# Patient Record
Sex: MALE | Race: WHITE
[De-identification: names, ages, dates, MRNs, and addresses within clinical notes are randomized per-mention and may not be internally consistent; named-entity substitution may affect disease eponyms.]

---

## 2018-06-14 ENCOUNTER — HOSPITAL ENCOUNTER (EMERGENCY)
Dept: HOSPITAL 11 - JP.ED | Age: 83
Discharge: HOME | End: 2018-06-14
Payer: MEDICARE

## 2018-06-14 VITALS — SYSTOLIC BLOOD PRESSURE: 126 MMHG | DIASTOLIC BLOOD PRESSURE: 59 MMHG

## 2018-06-14 DIAGNOSIS — Z88.1: ICD-10-CM

## 2018-06-14 DIAGNOSIS — M19.90: ICD-10-CM

## 2018-06-14 DIAGNOSIS — Z79.82: ICD-10-CM

## 2018-06-14 DIAGNOSIS — E86.0: Primary | ICD-10-CM

## 2018-06-14 DIAGNOSIS — Z88.0: ICD-10-CM

## 2018-06-14 DIAGNOSIS — Z87.891: ICD-10-CM

## 2018-06-14 DIAGNOSIS — Z79.899: ICD-10-CM

## 2018-06-14 DIAGNOSIS — E78.00: ICD-10-CM

## 2018-06-14 PROCEDURE — 86140 C-REACTIVE PROTEIN: CPT

## 2018-06-14 PROCEDURE — 84484 ASSAY OF TROPONIN QUANT: CPT

## 2018-06-14 PROCEDURE — 96374 THER/PROPH/DIAG INJ IV PUSH: CPT

## 2018-06-14 PROCEDURE — 70450 CT HEAD/BRAIN W/O DYE: CPT

## 2018-06-14 PROCEDURE — 96361 HYDRATE IV INFUSION ADD-ON: CPT

## 2018-06-14 PROCEDURE — 36415 COLL VENOUS BLD VENIPUNCTURE: CPT

## 2018-06-14 PROCEDURE — 71046 X-RAY EXAM CHEST 2 VIEWS: CPT

## 2018-06-14 PROCEDURE — 80053 COMPREHEN METABOLIC PANEL: CPT

## 2018-06-14 PROCEDURE — 85025 COMPLETE CBC W/AUTO DIFF WBC: CPT

## 2018-06-14 PROCEDURE — 99285 EMERGENCY DEPT VISIT HI MDM: CPT

## 2018-06-14 PROCEDURE — 81001 URINALYSIS AUTO W/SCOPE: CPT

## 2018-06-14 PROCEDURE — 93005 ELECTROCARDIOGRAM TRACING: CPT

## 2018-06-14 PROCEDURE — 84443 ASSAY THYROID STIM HORMONE: CPT

## 2018-06-14 NOTE — CR
CHEST: 2 view

 

CLINICAL HISTORY:Shortness of breath, confusion

 

COMPARISON:None

 

FINDINGS:  Patient has had prior sternotomy. There are atherosclerotic changes in the aorta. There is
 some elevation of the left hemidiaphragm which is likely chronic. No infiltrates are seen there is n
o pleural effusion. The lungs are hyperaerated.

 

 

IMPRESSION:  Previous coronary artery bypass

 

Chronic elevation left hemidiaphragm

 

Emphysematous changes

## 2018-06-14 NOTE — CT
Head CT wo Cont

 

CLINICAL HISTORY: Confusion 

 

COMPARISON: 2010

 

TECHNIQUE: Transverse scans were obtained from the base of the skull through the vertex without IV co
ntrast on a multislice, multidetector CT scanner. Auto dosage reduction and iterative reconstruction 
techniques employed.

 

FINDINGS: The patient is tilted in the gantry.. There is no mass effect, hemorrhage, or extraaxial co
llection. The basal cisterns and sulci over the convexities are prominent. The ventricles are promine
nt.

 

IMPRESSION: Moderate age-related atrophy.

 

No focal lesion, hemorrhage or extra-axial collection

## 2018-06-14 NOTE — EDM.PDOC
ED HPI GENERAL MEDICAL PROBLEM





- General


Chief Complaint: Neuro Symptoms/Deficits


Stated Complaint: LOSS OF COORDINATION/WEAKNESS/STROKE??


Time Seen by Provider: 06/14/18 09:05


Source of Information: Reports: Patient


History Limitations: Reports: No Limitations





- History of Present Illness


INITIAL COMMENTS - FREE TEXT/NARRATIVE: 





pt was confused yesterday and still having some problem thinking today. 


Onset: Gradual


Duration: Day(s):


Location: Reports: Generalized


Associated Symptoms: Reports: Weakness





- Related Data


 Allergies











Allergy/AdvReac Type Severity Reaction Status Date / Time


 


erythromycin base Allergy  Liver Verified 12/31/16 02:31





   Problems  


 


Penicillins Allergy  Hives Verified 12/31/16 02:31











Home Meds: 


 Home Meds





Aspirin [Ecotrin] 81 mg PO DAILY 06/10/16 [History]


Calcium Carbonate/Vitamin D3 [Calcium Carbonate/Vitamin D 1250 MG-200 Unit] 600 

mg PO DAILY 06/10/16 [History]


Clopidogrel [Plavix] 75 mg PO DAILY 06/10/16 [History]


Folic Acid 400 mcg PO BID 06/10/16 [History]


Metoprolol Tartrate [Lopressor] 25 mg PO BID 06/10/16 [History]


amLODIPine [Norvasc] 5 mg PO DAILY 06/10/16 [History]











Past Medical History


HEENT History: Reports: Impaired Vision


Cardiovascular History: Reports: Bypass, High Cholesterol


Genitourinary History: Reports: Other (See Below)


Other Genitourinary History: past uti


Musculoskeletal History: Reports: Arthritis, Fracture


Other Musculoskeletal History: l elbow


Neurological History: Reports: Seizure, TIA





- Infectious Disease History


Infectious Disease History: Reports: Chicken Pox, Mumps, Rubella





- Past Surgical History


Cardiovascular Surgical History: Reports: Coronary Artery Bypass


GI Surgical History: Reports: Appendectomy, Cholecystectomy





Social & Family History





- Tobacco Use


Smoking Status *Q: Former Smoker


Years of Tobacco use: 10


Used Tobacco, but Quit: Yes


Month/Year Tobacco Last Used: 12/80


Second Hand Smoke Exposure: Yes





- Caffeine Use


Caffeine Use: Reports: Coffee





- Recreational Drug Use


Recreational Drug Use: No





ED ROS GENERAL





- Review of Systems


Review Of Systems: See Below


Constitutional: Reports: No Symptoms


HEENT: Reports: No Symptoms


Respiratory: Reports: No Symptoms


Cardiovascular: Reports: No Symptoms


Endocrine: Reports: No Symptoms


GI/Abdominal: Reports: No Symptoms


: Reports: No Symptoms


Musculoskeletal: Reports: Other (pt felt like he was not as coordinated and not 

able to do things as well as usual. )


Skin: Reports: No Symptoms


Neurological: Reports: Other (pt talked about being confused but he answered 

all questions well. )


Psychiatric: Reports: No Symptoms





ED EXAM, NEURO





- Physical Exam


Exam: See Below


Text/Narrative:: 





pt arrived with a history  of not funtioning as well as usual . 


Exam Limited By: No Limitations


General Appearance: Alert, Anxious, Other (pupils equal and reactive)


Ears: Normal TMs


Nose: Normal Inspection


Throat/Mouth: Normal Inspection


Head Exam: Atraumatic


Neck: Normal Inspection


Respiratory/Chest: No Respiratory Distress


Cardiovascular: Regular Rate, Rhythm


GI/Abdominal: Soft, Non-Tender


 (Male) Exam: Deferred


Rectal (Males) Exam: Deferred


Neurological: Alert


Back Exam: Normal Inspection


Extremities: Normal Inspection


Psychiatric: Normal Affect





Course





- Vital Signs


Last Recorded V/S: 


 Last Vital Signs











Temp  36.4 C   06/14/18 13:51


 


Pulse  74   06/14/18 13:51


 


Resp  16   06/14/18 13:51


 


BP  126/59 L  06/14/18 13:51


 


Pulse Ox  98   06/14/18 13:51








 





Orthostatic Blood Pressure [     134/68


Standing]                        


Orthostatic Blood Pressure [     127/67


Sitting]                         


Orthostatic Blood Pressure [     137/52


Supine]                          











- Orders/Labs/Meds


Orders: 


 Active Orders 24 hr











 Category Date Time Status


 


 EKG Documentation Completion [RC] ASDIRECTED Care  06/14/18 08:34 Active


 


 Orthostatic Vital Signs [RC] ASDIRECTED Care  06/14/18 09:25 Active


 


 UA W/MICROSCOPIC [URIN] Urgent Lab  06/14/18 08:54 Ordered


 


 Sodium Chloride 0.9% [Normal Saline] 1,000 ml Med  06/14/18 09:00 Active





 IV ASDIRECTED   


 


 EKG 12 Lead [EK] Routine Ther  06/14/18 08:33 Ordered








 Medication Orders





Sodium Chloride (Normal Saline)  1,000 mls @ 400 mls/hr IV ASDIRECTED SUJIT


   Last Admin: 06/14/18 09:28  Dose: 400 mls/hr








Labs: 


 Laboratory Tests











  06/14/18 06/14/18 06/14/18 Range/Units





  08:38 08:38 08:38 


 


WBC  5.1    (4.5-11.0)  K/uL


 


RBC  4.40    (4.30-5.90)  M/uL


 


Hgb  13.3    (12.0-15.0)  g/dL


 


Hct  40.2    (40.0-54.0)  %


 


MCV  91    (80-98)  fL


 


MCH  30    (27-31)  pg


 


MCHC  33    (32-36)  %


 


Plt Count  251    (150-400)  K/uL


 


Neut % (Auto)  66    (36-66)  %


 


Lymph % (Auto)  13 L    (24-44)  %


 


Mono % (Auto)  20 H    (2-6)  %


 


Eos % (Auto)  1 L    (2-4)  %


 


Baso % (Auto)  0    (0-1)  %


 


Sodium   132 L   (140-148)  mmol/L


 


Potassium   3.5 L   (3.6-5.2)  mmol/L


 


Chloride   97 L   (100-108)  mmol/L


 


Carbon Dioxide   27   (21-32)  mmol/L


 


Anion Gap   11.5   (5.0-14.0)  mmol/L


 


BUN   15   (7-18)  mg/dL


 


Creatinine   1.0   (0.8-1.3)  mg/dL


 


Est Cr Clr Drug Dosing   43.41   mL/min


 


Estimated GFR (MDRD)   > 60   (>60)  


 


Glucose   157 H   ()  mg/dL


 


Calcium   8.2 L   (8.5-10.1)  mg/dL


 


Total Bilirubin   0.7   (0.2-1.0)  mg/dL


 


AST   23   (15-37)  U/L


 


ALT   21   (12-78)  U/L


 


Alkaline Phosphatase   94   ()  U/L


 


Troponin I    < 0.017  (0.000-0.056)  ng/mL


 


C-Reactive Protein     (0.0-0.3)  mg/dL


 


Total Protein   6.7   (6.4-8.2)  g/dL


 


Albumin   3.0 L   (3.4-5.0)  g/dL


 


Globulin   3.7 H   (2.3-3.5)  g/dL


 


Albumin/Globulin Ratio   0.8 L   (1.2-2.2)  


 


TSH, Ultra Sensitive     (0.358-3.740)  uIU/mL


 


Urine Color     


 


Urine Appearance     


 


Urine pH     (4.5-8.0)  


 


Ur Specific Gravity     (1.008-1.030)  


 


Urine Protein     (NEGATIVE)  mg/dL


 


Urine Glucose (UA)     (NEGATIVE)  mg/dL


 


Urine Ketones     (NEGATIVE)  mg/dL


 


Urine Occult Blood     (NEGATIVE)  


 


Urine Nitrite     (NEGAITVE)  


 


Urine Bilirubin     (NEGATIVE)  


 


Urine Urobilinogen     (NORMAL)  mg/dL


 


Ur Leukocyte Esterase     (NEGATIVE)  


 


Urine RBC     (0-5)  


 


Urine WBC     (0-5)  


 


Ur Epithelial Cells     


 


Amorphous Sediment     


 


Urine Bacteria     


 


Urine Mucus     


 


Urine Other     














  06/14/18 06/14/18 06/14/18 Range/Units





  08:54 09:02 10:38 


 


WBC     (4.5-11.0)  K/uL


 


RBC     (4.30-5.90)  M/uL


 


Hgb     (12.0-15.0)  g/dL


 


Hct     (40.0-54.0)  %


 


MCV     (80-98)  fL


 


MCH     (27-31)  pg


 


MCHC     (32-36)  %


 


Plt Count     (150-400)  K/uL


 


Neut % (Auto)     (36-66)  %


 


Lymph % (Auto)     (24-44)  %


 


Mono % (Auto)     (2-6)  %


 


Eos % (Auto)     (2-4)  %


 


Baso % (Auto)     (0-1)  %


 


Sodium     (140-148)  mmol/L


 


Potassium     (3.6-5.2)  mmol/L


 


Chloride     (100-108)  mmol/L


 


Carbon Dioxide     (21-32)  mmol/L


 


Anion Gap     (5.0-14.0)  mmol/L


 


BUN     (7-18)  mg/dL


 


Creatinine     (0.8-1.3)  mg/dL


 


Est Cr Clr Drug Dosing     mL/min


 


Estimated GFR (MDRD)     (>60)  


 


Glucose     ()  mg/dL


 


Calcium     (8.5-10.1)  mg/dL


 


Total Bilirubin     (0.2-1.0)  mg/dL


 


AST     (15-37)  U/L


 


ALT     (12-78)  U/L


 


Alkaline Phosphatase     ()  U/L


 


Troponin I     (0.000-0.056)  ng/mL


 


C-Reactive Protein   0.82 H   (0.0-0.3)  mg/dL


 


Total Protein     (6.4-8.2)  g/dL


 


Albumin     (3.4-5.0)  g/dL


 


Globulin     (2.3-3.5)  g/dL


 


Albumin/Globulin Ratio     (1.2-2.2)  


 


TSH, Ultra Sensitive    3.273  (0.358-3.740)  uIU/mL


 


Urine Color  Yellow    


 


Urine Appearance  Slightly cloudy    


 


Urine pH  6.0    (4.5-8.0)  


 


Ur Specific Gravity  1.015    (1.008-1.030)  


 


Urine Protein  Negative    (NEGATIVE)  mg/dL


 


Urine Glucose (UA)  Normal    (NEGATIVE)  mg/dL


 


Urine Ketones  Negative    (NEGATIVE)  mg/dL


 


Urine Occult Blood  Negative    (NEGATIVE)  


 


Urine Nitrite  Negative    (NEGAITVE)  


 


Urine Bilirubin  Small    (NEGATIVE)  


 


Urine Urobilinogen  Normal    (NORMAL)  mg/dL


 


Ur Leukocyte Esterase  Negative    (NEGATIVE)  


 


Urine RBC  Not seen    (0-5)  


 


Urine WBC  Not seen    (0-5)  


 


Ur Epithelial Cells  Not seen    


 


Amorphous Sediment  Many    


 


Urine Bacteria  Not seen    


 


Urine Mucus  Moderate    


 


Urine Other  See note    











Meds: 


Medications











Generic Name Dose Route Start Last Admin





  Trade Name Freq  PRN Reason Stop Dose Admin


 


Sodium Chloride  1,000 mls @ 400 mls/hr  06/14/18 09:00  06/14/18 09:28





  Normal Saline  IV   400 mls/hr





  ASDIRECTED SUJIT   Administration





     





     





     





     














Discontinued Medications














Generic Name Dose Route Start Last Admin





  Trade Name Freq  PRN Reason Stop Dose Admin


 


Sodium Chloride  1,000 mls @ 400 mls/hr  06/14/18 12:45  06/14/18 12:45





  Normal Saline  IV  06/14/18 15:14  400 mls/hr





  ASDIRECTED SUJIT   Administration





     





     





     





     


 


Ondansetron HCl  4 mg  06/14/18 09:00  06/14/18 09:25





  Zofran  IVPUSH  06/14/18 09:01  4 mg





  ONETIME ONE   Administration





     





     





     





     














- Re-Assessments/Exams


Free Text/Narrative Re-Assessment/Exam: 





06/14/18 15:40


pt is alert and answering questions well, he appears to be very alert. His head 

scan was normal. His lab looks good. His ekg was normal\l.  He had lunch and 

ate well. He was hydrated with 2 liters of fluids.  He is feeling better. His 

daughter was [place]\ed and she felt she could come and be with him some. She 

will check on him





Departure





- Departure


Time of Disposition: 15:43


Disposition: Home, Self-Care 01


Condition: Fair


Clinical Impression: 


 Dehydration








- Discharge Information


Referrals: 


Adonay Pineda, NP [Primary Care Provider] - 


Forms:  ED Department Discharge


Care Plan Goals: 


daughter to be his support system, appt at the clinic on Monday. , encourage 

fluids, cont same meds. 





- My Orders


Last 24 Hours: 


My Active Orders





06/14/18 08:33


EKG 12 Lead [EK] Routine 





06/14/18 08:34


EKG Documentation Completion [RC] ASDIRECTED 





06/14/18 08:54


UA W/MICROSCOPIC [URIN] Urgent 





06/14/18 09:00


Sodium Chloride 0.9% [Normal Saline] 1,000 ml IV ASDIRECTED 





06/14/18 09:25


Orthostatic Vital Signs [RC] ASDIRECTED 














- Assessment/Plan


Last 24 Hours: 


My Active Orders





06/14/18 08:33


EKG 12 Lead [EK] Routine 





06/14/18 08:34


EKG Documentation Completion [RC] ASDIRECTED 





06/14/18 08:54


UA W/MICROSCOPIC [URIN] Urgent 





06/14/18 09:00


Sodium Chloride 0.9% [Normal Saline] 1,000 ml IV ASDIRECTED 





06/14/18 09:25


Orthostatic Vital Signs [RC] ASDIRECTED